# Patient Record
Sex: MALE | Race: WHITE | NOT HISPANIC OR LATINO | URBAN - METROPOLITAN AREA
[De-identification: names, ages, dates, MRNs, and addresses within clinical notes are randomized per-mention and may not be internally consistent; named-entity substitution may affect disease eponyms.]

---

## 2021-07-02 ENCOUNTER — WEB ENCOUNTER (OUTPATIENT)
Dept: URBAN - METROPOLITAN AREA CLINIC 54 | Facility: CLINIC | Age: 41
End: 2021-07-02

## 2021-07-20 ENCOUNTER — OFFICE VISIT (OUTPATIENT)
Dept: RURAL CLINIC 6 | Facility: CLINIC | Age: 41
End: 2021-07-20
Payer: COMMERCIAL

## 2021-07-20 ENCOUNTER — DASHBOARD ENCOUNTERS (OUTPATIENT)
Age: 41
End: 2021-07-20

## 2021-07-20 DIAGNOSIS — K21.01 GASTROESOPHAGEAL REFLUX DISEASE WITH ESOPHAGITIS AND HEMORRHAGE: ICD-10-CM

## 2021-07-20 PROCEDURE — 99203 OFFICE O/P NEW LOW 30 MIN: CPT | Performed by: INTERNAL MEDICINE

## 2021-07-20 RX ORDER — SERTRALINE 25 MG/1
1 TABLET TABLET, FILM COATED ORAL ONCE A DAY
Status: DISCONTINUED | COMMUNITY

## 2021-07-20 RX ORDER — ESOMEPRAZOLE MAGNESIUM 40 MG/1
1 CAPSULE CAPSULE, DELAYED RELEASE ORAL ONCE A DAY
Qty: 30 | Refills: 3 | OUTPATIENT
Start: 2021-07-20

## 2021-07-20 RX ORDER — CLONAZEPAM 0.5 MG/1
1 TABLET AT BEDTIME TABLET ORAL ONCE A DAY
Status: ACTIVE | COMMUNITY

## 2021-07-20 NOTE — HPI-TODAY'S VISIT:
41-year-old male with a month or more of significant heartburn including nocturnal regurgitation and one episode or 2 episodes of nausea and vomiting of black material with a small amount of bright red blood.  There is no dysphagia.  He has started on Nexium and has noted marked improvement.  He is also taking famotidine.  He has had no prior issues of problems.  His weight has been stable.  He has no cardiac or respiratory problems.  His only prior surgery was for a broken left collarbone.  His bowels have been normal without melena or bright red blood.  There is no family history of digestive issues or colon cancer.

## 2021-07-21 PROBLEM — 266433003: Status: ACTIVE | Noted: 2021-07-21

## 2021-08-13 ENCOUNTER — OFFICE VISIT (OUTPATIENT)
Dept: URBAN - METROPOLITAN AREA SURGERY CENTER 14 | Facility: SURGERY CENTER | Age: 41
End: 2021-08-13
Payer: COMMERCIAL

## 2021-08-13 DIAGNOSIS — K21.00 ALKALINE REFLUX ESOPHAGITIS: ICD-10-CM

## 2021-08-13 PROCEDURE — G8907 PT DOC NO EVENTS ON DISCHARG: HCPCS | Performed by: INTERNAL MEDICINE

## 2021-08-13 PROCEDURE — 43235 EGD DIAGNOSTIC BRUSH WASH: CPT | Performed by: INTERNAL MEDICINE
